# Patient Record
Sex: MALE | Race: WHITE
[De-identification: names, ages, dates, MRNs, and addresses within clinical notes are randomized per-mention and may not be internally consistent; named-entity substitution may affect disease eponyms.]

---

## 2018-02-01 ENCOUNTER — HOSPITAL ENCOUNTER (EMERGENCY)
Dept: HOSPITAL 17 - NEPK | Age: 28
Discharge: HOME | End: 2018-02-01
Payer: SELF-PAY

## 2018-02-01 VITALS
OXYGEN SATURATION: 100 % | SYSTOLIC BLOOD PRESSURE: 118 MMHG | HEART RATE: 70 BPM | RESPIRATION RATE: 18 BRPM | DIASTOLIC BLOOD PRESSURE: 71 MMHG | TEMPERATURE: 98.5 F

## 2018-02-01 VITALS — WEIGHT: 149.91 LBS | HEIGHT: 71 IN | BODY MASS INDEX: 20.99 KG/M2

## 2018-02-01 DIAGNOSIS — Z88.2: ICD-10-CM

## 2018-02-01 DIAGNOSIS — K02.9: Primary | ICD-10-CM

## 2018-02-01 PROCEDURE — 99283 EMERGENCY DEPT VISIT LOW MDM: CPT

## 2018-02-01 NOTE — PD
HPI


Chief Complaint:  Oral / Dental Pain or Problem


Time Seen by Provider:  13:27


Travel History


International Travel<30 days:  No


Contact w/Intl Traveler<30days:  No


Traveled to known affect area:  No





History of Present Illness


HPI


27-year-old male presents for evaluation of dental pain.  Symptoms are 

yesterday.  Pain is a throbbing pain which is constant, localized to left 

maxillary first premolar, worse when chewing, unrelieved with salt water 

gargles.  He reports that he has had issues with all of his teeth for quite 

some time.  In the past he has seen a dentist and told that he required all of 

his teeth to be extracted and that he would need dentures [place] he cannot 

afford the surgery.  His pain is exacerbated after yesterday biting into a fork 

accidentally and part of his decayed tooth broke off.  He has no other 

complaints at this time.





PFSH


Past Medical History


Medical History:  Denies Significant Hx


Tetanus Vaccination:  Unknown





Past Surgical History


Surgical History:  No Previous Surgery





Social History


Alcohol Use:  No


Tobacco Use:  No


Substance Use:  No





Allergies-Medications


(Allergen,Severity, Reaction):  


Coded Allergies:  


     Sulfa (Sulfonamide Antibiotics) (Verified  Allergy, Unknown, 2/1/18)


Reported Meds & Prescriptions





Reported Meds & Active Scripts


Active


Ibuprofen 800 Mg Tab 800 Mg PO Q6HR PRN


Magic Mouthwash Adult Liq (Multi-Ingredient Mouthwash/Gargle) 120 Ml Susp 10 Ml 

SWISH-SPIT ACHS


     Each 5mL contains: Nystatin 200,000units,


     Diphenhydramine 4.25mg, Viscous Lidocaine 10mg,


     Cherry syrup 0.8 mL


Penicillin V Potassium 500 Mg Tab 500 Mg PO Q8H 10 Days








Review of Systems


General / Constitutional:  No: Fever, Chills


HENT:  Positive: Dental Difficulties





Physical Exam


Narrative


GENERAL: Well-developed well-nourished male in no acute distress


SKIN: Warm and dry.


HEAD: Atraumatic. Normocephalic. 


EYES: Pupils equal and round. No scleral icterus. No injection or drainage. 


ENT: No nasal bleeding or discharge.  Mucous membranes pink and moist.  All of 

the patient's teeth are in advanced stages of decay.  There is no trismus, no 

gingival edema, no sublingual edema, no facial edema.


NECK: Trachea midline. No JVD.  No lymphadenopathy


CARDIOVASCULAR: Regular rate and rhythm.  No murmur appreciated.


RESPIRATORY: No accessory muscle use. Clear to auscultation. Breath sounds 

equal bilaterally.





Data


Data


Last Documented VS





Vital Signs








  Date Time  Temp Pulse Resp B/P (MAP) Pulse Ox O2 Delivery O2 Flow Rate FiO2


 


2/1/18 13:14 98.5 70 18 118/71 (87) 100 Room Air  








Orders





 Orders


Ed Discharge Order (2/1/18 13:33)








MDM


Medical Decision Making


Medical Screen Exam Complete:  Yes


Emergency Medical Condition:  Yes


Medical Record Reviewed:  Yes


Differential Diagnosis


Dental caries, pulpitis, pericoronitis, periodontal abscess, dental fracture


Narrative Course


The patient has advanced decay in all of his teeth and ultimately require tooth 

extraction.  He understands that this is the definitive treatment.  In the 

meantime the patient will be discharged with a short course of analgesics and 

antibiotics.





Diagnosis





 Primary Impression:  


 Dental caries





***Additional Instructions:  


Medication as prescribed.  Follow up with a dentist for definitive therapy.  

Return for any emergent medical conditions.


***Med/Other Pt SpecificInfo:  Prescription(s) given


Scripts


Ibuprofen (Ibuprofen) 800 Mg Tab


800 MG PO Q6HR Y for PAIN, #40 TAB 0 Refills


   Prov: Manfred Loera MD         2/1/18 


Nystatin-Diphenhydramine-Lidocaine Liq (Magic Mouthwash Adult Liq) 120 Ml Susp


10 ML SWISH-SPIT ACHS for Mouth sores, #120 ML 1 Refill


   Each 5mL contains: Nystatin 200,000units,


   Diphenhydramine 4.25mg, Viscous Lidocaine 10mg,


   Cherry syrup 0.8 mL


   Prov: Manfred Loera MD         2/1/18 


Penicillin V Potassium (Penicillin V Potassium) 500 Mg Tab


500 MG PO Q8H for Infection for 10 Days, #30 TAB 0 Refills


   Prov: Manfred Loera MD         2/1/18


Disposition:  01 DISCHARGE HOME


Condition:  Stable











Josafat Smith Feb 1, 2018 13:36

## 2018-04-05 ENCOUNTER — HOSPITAL ENCOUNTER (EMERGENCY)
Dept: HOSPITAL 17 - NEPD | Age: 28
Discharge: HOME | End: 2018-04-05
Payer: SELF-PAY

## 2018-04-05 VITALS
TEMPERATURE: 98 F | RESPIRATION RATE: 20 BRPM | SYSTOLIC BLOOD PRESSURE: 130 MMHG | OXYGEN SATURATION: 98 % | DIASTOLIC BLOOD PRESSURE: 68 MMHG | HEART RATE: 82 BPM

## 2018-04-05 VITALS — HEIGHT: 71 IN | WEIGHT: 171.96 LBS | BODY MASS INDEX: 24.07 KG/M2

## 2018-04-05 VITALS
RESPIRATION RATE: 14 BRPM | SYSTOLIC BLOOD PRESSURE: 129 MMHG | HEART RATE: 66 BPM | OXYGEN SATURATION: 100 % | TEMPERATURE: 97.8 F | DIASTOLIC BLOOD PRESSURE: 65 MMHG

## 2018-04-05 DIAGNOSIS — R60.0: ICD-10-CM

## 2018-04-05 DIAGNOSIS — K02.9: Primary | ICD-10-CM

## 2018-04-05 PROCEDURE — 96372 THER/PROPH/DIAG INJ SC/IM: CPT

## 2018-04-05 PROCEDURE — 99283 EMERGENCY DEPT VISIT LOW MDM: CPT

## 2018-04-05 NOTE — PD
HPI


Chief Complaint:  Oral / Dental Pain or Problem


Time Seen by Provider:  19:05


Travel History


International Travel<30 days:  No


Contact w/Intl Traveler<30days:  No


Traveled to known affect area:  No





History of Present Illness


HPI


27-year-old male with no significant medical history presents emergency 

department for evaluation of dental pain with associated facial swelling.  

Patient states he had a "bad tooth" break yesterday.  He noticed some mild 

facial swelling but it has progressed throughout the day today.  He denies any 

fever or chills.  Reports significant pain at the site.  No drainage.  Patient 

is up-to-date on his tetanus vaccination.  He has no other symptoms to report 

at this time.





PFSH


Past Medical History


Medical History:  Denies Significant Hx


Diminished Hearing:  No


Tetanus Vaccination:  < 5 Years


Influenza Vaccination:  No





Past Surgical History


Other Surgery:  Yes (lymph nodes surgery as a kid)





Social History


Alcohol Use:  No


Tobacco Use:  No


Substance Use:  No





Allergies-Medications


(Allergen,Severity, Reaction):  


Coded Allergies:  


     Sulfa (Sulfonamide Antibiotics) (Verified  Allergy, Unknown, 4/5/18)


Reported Meds & Prescriptions





Reported Meds & Active Scripts


Active


No Active Prescriptions or Reported Medications    








Review of Systems


Except as stated in HPI:  all other systems reviewed are Neg





Physical Exam


Narrative


GENERAL: Well-nourished, well-developed male patient, in no acute distress.


SKIN: Focused skin assessment warm/dry.


HEAD: Normocephalic.  Facial swelling along the left maxillary sinus.  No 

erythema.  No induration.  No fluctuation.


EYES: No scleral icterus. No injection or drainage. 


ENT: Mucosa pink and moist. No erythema or exudates. No uvular edema. No uvular

, palatal, or tonsillar deviation. Airway patent. Nasal turbinates appear 

normal without nasal blood, purulent drainage or septal hematoma.


DENTAL: General has poor dentition.  The maxillary left lateral incisor and 

second bicuspid are completely decayed to the gingiva with surrounding gingival 

erythema and edema.  No appreciable abscess.  No malocclusion.


NECK: Supple, trachea midline. No JVD or lymphadenopathy.


CARDIOVASCULAR: Regular rate and rhythm without murmurs, gallops, or rubs. 


RESPIRATORY: Breath sounds equal bilaterally. No accessory muscle use.





Data


Data


Last Documented VS





Vital Signs








  Date Time  Temp Pulse Resp B/P (MAP) Pulse Ox O2 Delivery O2 Flow Rate FiO2


 


4/5/18 19:11 98.0 82 20 130/68 (88) 98 Room Air  








Orders





 Orders


Clindamycin (Cleocin) (4/5/18 19:15)


Dexamethasone Inj (Decadron Inj) (4/5/18 19:15)


Acetamin-Hydrocod 325-5 Mg (Norco  5-325 (4/5/18 19:15)








MDM


Medical Decision Making


Medical Screen Exam Complete:  Yes


Emergency Medical Condition:  Yes


Medical Record Reviewed:  Yes


Differential Diagnosis


Dental abscess versus gingivitis versus pulpitis versus dental carry versus 

periodontal disease


Narrative Course


27-year-old male presents emergency department for evaluation of a broken tooth 

with associated facial swelling.  Patient does have generalized poor dentition 

with several dental caries.  Facial swelling is adjacent to his left maxillary 

second bicuspid that is decayed down to the gingiva.  I do not see an abscess 

to drain at this time.  Patient is given clindamycin, Decadron, pain control 

here in the emergency department.  He will be discharged home on oral 

antibiotics.  I have advised follow-up with a dentist.  He agrees to return 

immediately with any acute worsening symptoms.





Diagnosis





 Primary Impression:  


 Dental caries


 Additional Impression:  


 Facial edema


Referrals:  


Dentist





Primary Care Physician


Patient Instructions:  Dental Caries (ED), General Instructions





***Additional Instructions:  


Do not push or squeeze the area


Follow-up with a dentist as soon as possible.


Follow-up with a primary care provider


Return immediately with any acute worsening symptoms


***Med/Other Pt SpecificInfo:  Prescription(s) given


Scripts


Tramadol (Ultram) 50 Mg Tab


50 MG PO Q8H Y for PAIN GREATER THAN 6, #15 TAB 0 Refills


   Prov: Yasmine Mayfield         4/5/18 


Ibuprofen (Ibuprofen) 800 Mg Tab


800 MG PO Q8H Y for Pain/Inflammation, #30 TAB 0 Refills


   Prov: Yasmine Mayfield         4/5/18 


Chlorhexidine Gluconate (Mouth) Liq (Peridex Liq) 0.12% Soln


15 ML SWISH-SPIT BID, #473 ML 0 Refills


   Prov: Yasmine Mayfield         4/5/18 


Clindamycin (Clindamycin) 150 Mg Cap


300 MG PO Q6H for Infection for 10 Days, #80 CAP 0 Refills


   Prov: Yasmine Mayfield         4/5/18


Disposition:  01 DISCHARGE HOME


Condition:  Stable











Mayfield,Yasmine ARNP Apr 5, 2018 19:19